# Patient Record
Sex: MALE | Race: WHITE | Employment: FULL TIME
[De-identification: names, ages, dates, MRNs, and addresses within clinical notes are randomized per-mention and may not be internally consistent; named-entity substitution may affect disease eponyms.]

---

## 2023-11-20 ENCOUNTER — HOSPITAL ENCOUNTER (EMERGENCY)
Facility: HOSPITAL | Age: 33
Discharge: HOME | End: 2023-11-20
Attending: EMERGENCY MEDICINE
Payer: COMMERCIAL

## 2023-11-20 VITALS
TEMPERATURE: 98.6 F | SYSTOLIC BLOOD PRESSURE: 152 MMHG | BODY MASS INDEX: 25.16 KG/M2 | DIASTOLIC BLOOD PRESSURE: 76 MMHG | WEIGHT: 166 LBS | HEART RATE: 75 BPM | RESPIRATION RATE: 16 BRPM | HEIGHT: 68 IN | OXYGEN SATURATION: 99 %

## 2023-11-20 DIAGNOSIS — R21 RASH AND OTHER NONSPECIFIC SKIN ERUPTION: Primary | ICD-10-CM

## 2023-11-20 PROCEDURE — 99283 EMERGENCY DEPT VISIT LOW MDM: CPT

## 2023-11-20 RX ORDER — BETAMETHASONE DIPROPIONATE 0.5 MG/G
CREAM TOPICAL
Qty: 45 G | Refills: 0 | Status: SHIPPED | OUTPATIENT
Start: 2023-11-20 | End: 2023-11-20 | Stop reason: SDUPTHER

## 2023-11-20 RX ORDER — BETAMETHASONE DIPROPIONATE 0.5 MG/G
CREAM TOPICAL
Qty: 45 G | Refills: 0 | Status: SHIPPED | OUTPATIENT
Start: 2023-11-20

## 2023-11-20 ASSESSMENT — ENCOUNTER SYMPTOMS
NAUSEA: 0
COLOR CHANGE: 1
VOMITING: 0
COUGH: 0
SHORTNESS OF BREATH: 0

## 2023-11-20 ASSESSMENT — PAIN DESCRIPTION - LOCATION: LOCATION: OTHER (COMMENT)

## 2023-11-20 ASSESSMENT — PAIN DESCRIPTION - ORIENTATION: ORIENTATION: LEFT

## 2023-11-20 ASSESSMENT — PAIN - FUNCTIONAL ASSESSMENT: PAIN_FUNCTIONAL_ASSESSMENT: 0-10

## 2023-11-20 ASSESSMENT — PAIN SCALES - GENERAL: PAINLEVEL_OUTOF10: 2

## 2023-11-20 NOTE — ED PROVIDER NOTES
[facility] EMERGENCY DEPT  EMERGENCY DEPARTMENT ENCOUNTER      Pt Name: Richard Alejandra  MRN: [id number]  [address] [facility] 1990  Date of evaluation: 11/20/2023  Provider: HARRISON Mcdonnell    CHIEF COMPLAINT       Chief Complaint   Patient presents with    Rash         HISTORY OF PRESENT ILLNESS    Patient is a 35year old male who presents to ED c/o rash which started 1 week ago. Reports rash was initially erythematous \"dark red almost purple\" appearing plaques that were pruritic on left upper leg/hip. The rash then spread to right bicep and one area on trunk. States he was seen at urgent care started keflex, zyrtec, prednisone and using lotrimin. States lotrimin dries the rash up, but no improvement of symptoms. Denies similar sx previously. Also denies any fever, chills, shortness of breath, difficulty breathing. Patient also denies any h/o allergic reaction as well as any new foods, detergents, soaps,lotions. No recent hiking or camping. Review of External Medical Records:     Nursing Notes were reviewed. REVIEW OF SYSTEMS       Review of Systems   Constitutional:  Negative for chills and fever. Respiratory:  Negative for cough and shortness of breath. Cardiovascular:  Negative for chest pain. Gastrointestinal:  Negative for nausea and vomiting. Genitourinary:  Negative for difficulty urinating. Musculoskeletal:  Negative for arthralgias and myalgias. Skin:  Positive for color change and rash. Neurological:  Negative for weakness, numbness and headaches. All other systems reviewed and are negative. Except as noted above the remainder of the review of systems was reviewed and negative. PAST MEDICAL HISTORY   No past medical history on file. SURGICAL HISTORY     No past surgical history on file. CURRENT MEDICATIONS       Current Discharge Medication List          ALLERGIES     Patient has no known allergies. FAMILY HISTORY     No family history on file.

## 2023-11-20 NOTE — DISCHARGE INSTRUCTIONS
Use steroid cream as prescribed. If you have used any new soaps/lotions/detergents or other products - stop using them. If symptoms persist - please follow-up with dermatologist whose information is provided. If you develop new or worsening symptoms please return to ER.

## 2023-11-20 NOTE — ED TRIAGE NOTES
Pt arrives to the ER for complaints of rash to left thigh, right bicep and left side of torso. Pt reports that he went [facility] and was given prednisone and keflex. Pt denies minimal changes with medications.      Pt states that the rash is itchy and can become painful